# Patient Record
Sex: FEMALE | Race: WHITE | NOT HISPANIC OR LATINO
[De-identification: names, ages, dates, MRNs, and addresses within clinical notes are randomized per-mention and may not be internally consistent; named-entity substitution may affect disease eponyms.]

---

## 2017-08-22 ENCOUNTER — RESULT REVIEW (OUTPATIENT)
Age: 22
End: 2017-08-22

## 2018-09-22 ENCOUNTER — RESULT REVIEW (OUTPATIENT)
Age: 23
End: 2018-09-22

## 2020-09-09 ENCOUNTER — RESULT REVIEW (OUTPATIENT)
Age: 25
End: 2020-09-09

## 2021-10-01 ENCOUNTER — APPOINTMENT (OUTPATIENT)
Dept: OBGYN | Facility: CLINIC | Age: 26
End: 2021-10-01
Payer: COMMERCIAL

## 2021-10-01 ENCOUNTER — LABORATORY RESULT (OUTPATIENT)
Age: 26
End: 2021-10-01

## 2021-10-01 VITALS
BODY MASS INDEX: 20.77 KG/M2 | WEIGHT: 110 LBS | DIASTOLIC BLOOD PRESSURE: 70 MMHG | SYSTOLIC BLOOD PRESSURE: 110 MMHG | HEIGHT: 61 IN

## 2021-10-01 DIAGNOSIS — R19.37: ICD-10-CM

## 2021-10-01 DIAGNOSIS — R14.0 ABDOMINAL DISTENSION (GASEOUS): ICD-10-CM

## 2021-10-01 PROCEDURE — 87070 CULTURE OTHR SPECIMN AEROBIC: CPT

## 2021-10-01 PROCEDURE — 99395 PREV VISIT EST AGE 18-39: CPT

## 2021-10-03 LAB
CANDIDA VAG CYTO: NOT DETECTED
G VAGINALIS+PREV SP MTYP VAG QL MICRO: NOT DETECTED
T VAGINALIS VAG QL WET PREP: NOT DETECTED

## 2021-10-08 LAB — CYTOLOGY CVX/VAG DOC THIN PREP: ABNORMAL

## 2021-10-12 LAB — HPV HIGH+LOW RISK DNA PNL CVX: NOT DETECTED

## 2021-11-02 ENCOUNTER — APPOINTMENT (OUTPATIENT)
Dept: OBGYN | Facility: CLINIC | Age: 26
End: 2021-11-02

## 2022-01-18 ENCOUNTER — RX RENEWAL (OUTPATIENT)
Age: 27
End: 2022-01-18

## 2022-12-05 ENCOUNTER — APPOINTMENT (OUTPATIENT)
Dept: OBGYN | Facility: CLINIC | Age: 27
End: 2022-12-05

## 2022-12-05 ENCOUNTER — LABORATORY RESULT (OUTPATIENT)
Age: 27
End: 2022-12-05

## 2022-12-05 VITALS
BODY MASS INDEX: 24.55 KG/M2 | DIASTOLIC BLOOD PRESSURE: 76 MMHG | HEART RATE: 96 BPM | HEIGHT: 61 IN | SYSTOLIC BLOOD PRESSURE: 116 MMHG | WEIGHT: 130 LBS

## 2022-12-05 DIAGNOSIS — Z01.419 ENCOUNTER FOR GYNECOLOGICAL EXAMINATION (GENERAL) (ROUTINE) W/OUT ABNORMAL FINDINGS: ICD-10-CM

## 2022-12-05 PROCEDURE — 99395 PREV VISIT EST AGE 18-39: CPT

## 2022-12-05 RX ORDER — NORETHINDRONE ACETATE AND ETHINYL ESTRADIOL AND FERROUS FUMARATE 1.5-30(21)
1.5-3 KIT ORAL DAILY
Qty: 3 | Refills: 3 | Status: ACTIVE | COMMUNITY
Start: 2022-12-05 | End: 1900-01-01

## 2022-12-05 NOTE — HISTORY OF PRESENT ILLNESS
[FreeTextEntry1] : 12/05/2022 JORDAN ELDER 27 year old female presents for an annual gyn exam.\par Patient offers no complaints. No abnormal vaginal bleeding, pain, or vaginal discharge.\par She reports regular, monthly menses while on Junel FE 1.5/30. \par Currently not sexually active, but has been in the past.\par No changes in medical status, medications, serious illness, hospitalizations, or surgeries since last visit.\par Reviewed patient's current medications.

## 2022-12-05 NOTE — PLAN
[FreeTextEntry1] : Health Maintenance:\par Normal gyn examination.\par Pap smear performed.\par Refill Rx given for Junel FE 1.5/30.\par F/u with Internist for screening laboratories.\par Nutrition and exercised discussed.\par RTO in 1 year, or PRN.\par

## 2022-12-05 NOTE — PHYSICAL EXAM

## 2022-12-20 ENCOUNTER — NON-APPOINTMENT (OUTPATIENT)
Age: 27
End: 2022-12-20

## 2022-12-20 LAB — CYTOLOGY CVX/VAG DOC THIN PREP: ABNORMAL

## 2023-02-08 RX ORDER — NORETHINDRONE ACETATE AND ETHINYL ESTRADIOL AND FERROUS FUMARATE 1.5-30(21)
1.5-3 KIT ORAL DAILY
Qty: 3 | Refills: 3 | Status: ACTIVE | COMMUNITY
Start: 2021-04-26 | End: 1900-01-01

## 2023-04-14 ENCOUNTER — APPOINTMENT (OUTPATIENT)
Dept: OBGYN | Facility: CLINIC | Age: 28
End: 2023-04-14
Payer: COMMERCIAL

## 2023-04-14 DIAGNOSIS — B97.7 PAPILLOMAVIRUS AS THE CAUSE OF DISEASES CLASSIFIED ELSEWHERE: ICD-10-CM

## 2023-04-14 DIAGNOSIS — R87.610 ATYPICAL SQUAMOUS CELLS OF UNDETERMINED SIGNIFICANCE ON CYTOLOGIC SMEAR OF CERVIX (ASC-US): ICD-10-CM

## 2023-04-14 LAB
HCG UR QL: NEGATIVE
QUALITY CONTROL: YES

## 2023-04-14 PROCEDURE — 57454 BX/CURETT OF CERVIX W/SCOPE: CPT

## 2023-04-14 PROCEDURE — 81025 URINE PREGNANCY TEST: CPT

## 2023-04-24 LAB — CORE LAB BIOPSY: NORMAL

## 2023-04-25 ENCOUNTER — NON-APPOINTMENT (OUTPATIENT)
Age: 28
End: 2023-04-25